# Patient Record
Sex: FEMALE | Race: WHITE | Employment: PART TIME | URBAN - METROPOLITAN AREA
[De-identification: names, ages, dates, MRNs, and addresses within clinical notes are randomized per-mention and may not be internally consistent; named-entity substitution may affect disease eponyms.]

---

## 2018-08-13 ENCOUNTER — HOSPITAL ENCOUNTER (OUTPATIENT)
Dept: GENERAL RADIOLOGY | Age: 73
Discharge: HOME | End: 2018-08-13
Payer: MEDICARE

## 2018-08-13 DIAGNOSIS — R05.9 COUGH: ICD-10-CM

## 2018-08-13 PROCEDURE — 71046 X-RAY EXAM CHEST 2 VIEWS: CPT

## 2021-07-09 ENCOUNTER — TRANSCRIBE ORDER (OUTPATIENT)
Dept: GENERAL RADIOLOGY | Age: 76
End: 2021-07-09

## 2021-07-09 ENCOUNTER — HOSPITAL ENCOUNTER (OUTPATIENT)
Dept: GENERAL RADIOLOGY | Age: 76
Discharge: HOME | End: 2021-07-09
Attending: FAMILY MEDICINE
Payer: MEDICARE
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
## 2021-07-09 DIAGNOSIS — M54.9 DORSALGIA: Primary | ICD-10-CM

## 2021-07-09 DIAGNOSIS — M54.9 DORSALGIA: ICD-10-CM

## 2021-07-09 PROCEDURE — 72050 X-RAY EXAM NECK SPINE 4/5VWS: CPT

## 2021-07-09 PROCEDURE — 72072 X-RAY EXAM THORAC SPINE 3VWS: CPT

## 2022-05-17 ENCOUNTER — OFFICE VISIT (OUTPATIENT)
Dept: NEUROLOGY | Age: 77
End: 2022-05-17
Payer: MEDICARE

## 2022-05-17 VITALS
HEIGHT: 65 IN | DIASTOLIC BLOOD PRESSURE: 84 MMHG | OXYGEN SATURATION: 98 % | WEIGHT: 146 LBS | HEART RATE: 80 BPM | SYSTOLIC BLOOD PRESSURE: 134 MMHG | BODY MASS INDEX: 24.32 KG/M2

## 2022-05-17 DIAGNOSIS — R26.89 IMBALANCE: Primary | ICD-10-CM

## 2022-05-17 DIAGNOSIS — E53.8 LOW SERUM VITAMIN B12: ICD-10-CM

## 2022-05-17 DIAGNOSIS — E55.9 VITAMIN D DEFICIENCY: ICD-10-CM

## 2022-05-17 DIAGNOSIS — G62.9 PERIPHERAL POLYNEUROPATHY: ICD-10-CM

## 2022-05-17 PROCEDURE — G9711 PT HX TOT COL OR COLON CA: HCPCS | Performed by: PSYCHIATRY & NEUROLOGY

## 2022-05-17 PROCEDURE — G8427 DOCREV CUR MEDS BY ELIG CLIN: HCPCS | Performed by: PSYCHIATRY & NEUROLOGY

## 2022-05-17 PROCEDURE — G8400 PT W/DXA NO RESULTS DOC: HCPCS | Performed by: PSYCHIATRY & NEUROLOGY

## 2022-05-17 PROCEDURE — G8420 CALC BMI NORM PARAMETERS: HCPCS | Performed by: PSYCHIATRY & NEUROLOGY

## 2022-05-17 PROCEDURE — G8432 DEP SCR NOT DOC, RNG: HCPCS | Performed by: PSYCHIATRY & NEUROLOGY

## 2022-05-17 PROCEDURE — 99204 OFFICE O/P NEW MOD 45 MIN: CPT | Performed by: PSYCHIATRY & NEUROLOGY

## 2022-05-17 PROCEDURE — G8536 NO DOC ELDER MAL SCRN: HCPCS | Performed by: PSYCHIATRY & NEUROLOGY

## 2022-05-17 PROCEDURE — 1090F PRES/ABSN URINE INCON ASSESS: CPT | Performed by: PSYCHIATRY & NEUROLOGY

## 2022-05-17 PROCEDURE — 1101F PT FALLS ASSESS-DOCD LE1/YR: CPT | Performed by: PSYCHIATRY & NEUROLOGY

## 2022-05-17 RX ORDER — ESOMEPRAZOLE MAGNESIUM 40 MG/1
40 CAPSULE, DELAYED RELEASE ORAL DAILY
COMMUNITY
Start: 2022-04-01

## 2022-05-17 RX ORDER — TRIAMTERENE AND HYDROCHLOROTHIAZIDE 37.5; 25 MG/1; MG/1
CAPSULE ORAL
COMMUNITY

## 2022-05-17 RX ORDER — ESTRADIOL 0.5 MG/1
TABLET ORAL
COMMUNITY

## 2022-05-17 RX ORDER — PANTOPRAZOLE SODIUM 40 MG/1
TABLET, DELAYED RELEASE ORAL
COMMUNITY
Start: 2022-03-03

## 2022-05-17 RX ORDER — BACLOFEN 10 MG/1
TABLET ORAL
COMMUNITY

## 2022-05-17 RX ORDER — DICLOFENAC SODIUM 10 MG/G
GEL TOPICAL
COMMUNITY

## 2022-05-17 RX ORDER — LANOLIN ALCOHOL/MO/W.PET/CERES
CREAM (GRAM) TOPICAL
COMMUNITY

## 2022-05-17 RX ORDER — FLUTICASONE PROPIONATE AND SALMETEROL 500; 50 UG/1; UG/1
POWDER RESPIRATORY (INHALATION)
COMMUNITY

## 2022-05-17 RX ORDER — DIAZEPAM 5 MG/1
TABLET ORAL
COMMUNITY

## 2022-05-17 RX ORDER — ERGOCALCIFEROL 1.25 MG/1
CAPSULE ORAL
COMMUNITY
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
## 2022-05-20 LAB
25(OH)D3+25(OH)D2 SERPL-MCNC: 17.2 NG/ML (ref 30–100)
ALBUMIN SERPL ELPH-MCNC: 4 G/DL (ref 2.9–4.4)
ALBUMIN/GLOB SERPL: 1.4 {RATIO} (ref 0.7–1.7)
ALPHA1 GLOB SERPL ELPH-MCNC: 0.3 G/DL (ref 0–0.4)
ALPHA2 GLOB SERPL ELPH-MCNC: 0.8 G/DL (ref 0.4–1)
B-GLOBULIN SERPL ELPH-MCNC: 1 G/DL (ref 0.7–1.3)
COPPER SERPL-MCNC: 130 UG/DL (ref 80–158)
GAMMA GLOB SERPL ELPH-MCNC: 0.9 G/DL (ref 0.4–1.8)
GLOBULIN SER-MCNC: 2.9 G/DL (ref 2.2–3.9)
IGA SERPL-MCNC: 132 MG/DL (ref 64–422)
IGG SERPL-MCNC: 883 MG/DL (ref 586–1602)
IGM SERPL-MCNC: 80 MG/DL (ref 26–217)
INTERPRETATION SERPL IEP-IMP: ABNORMAL
KAPPA LC FREE SER-MCNC: 28.9 MG/L (ref 3.3–19.4)
KAPPA LC FREE/LAMBDA FREE SER: 1.53 {RATIO} (ref 0.26–1.65)
LAMBDA LC FREE SERPL-MCNC: 18.9 MG/L (ref 5.7–26.3)
M PROTEIN SERPL ELPH-MCNC: ABNORMAL G/DL
PLEASE NOTE:, 149534: ABNORMAL
PROT SERPL-MCNC: 6.9 G/DL (ref 6–8.5)
VIT B12 SERPL-MCNC: 388 PG/ML (ref 232–1245)
ZINC SERPL-MCNC: 73 UG/DL (ref 44–115)

## 2022-05-21 NOTE — PROGRESS NOTES
Vitamin D is low and I would recommend she discuss with her primary physician a course of high-dose replacement if appropriate. Vitamin B12 is trending lower. I would recommend she take a daily B complex if not already. Copper and zinc are okay.

## 2022-05-27 ENCOUNTER — TELEPHONE (OUTPATIENT)
Dept: NEUROLOGY | Age: 77
End: 2022-05-27

## 2022-06-01 ENCOUNTER — TELEPHONE (OUTPATIENT)
Dept: NEUROLOGY | Age: 77
End: 2022-06-01

## 2022-06-15 ENCOUNTER — OFFICE VISIT (OUTPATIENT)
Dept: NEUROLOGY | Age: 77
End: 2022-06-15
Payer: MEDICARE

## 2022-06-15 DIAGNOSIS — R20.9 SKIN SENSATION DISTURBANCE: Primary | ICD-10-CM

## 2022-06-15 DIAGNOSIS — R26.89 IMBALANCE: ICD-10-CM

## 2022-06-15 PROCEDURE — 95886 MUSC TEST DONE W/N TEST COMP: CPT | Performed by: PSYCHIATRY & NEUROLOGY

## 2022-06-15 PROCEDURE — 95910 NRV CNDJ TEST 7-8 STUDIES: CPT | Performed by: PSYCHIATRY & NEUROLOGY

## 2022-06-15 NOTE — PROGRESS NOTES
[address] [facility]  [address]  Phone [phone] Fax [phone]  Test Date:  6/15/2022    Patient: Jonny Hawthorne : 1945 Physician: Rosana Blackburn DO   Sex: Female Height: 5' 5\" Ref Phys: Emir Greenberg   ID#: [id number]  Weight: 146 lbs. Technician: Mitch Oswald     Patient Complaints:  Imbalance; Skin sensation disturbance    NCV & EMG Findings:  Evaluation of the left Sup Peroneal sensory nerve showed no response (14 cm). The right Sup Peroneal sensory nerve showed reduced amplitude (2.0 µV). All remaining nerves  were within normal limits. All left vs. right side differences were within normal limits. All F Wave latencies were within normal limits. All examined muscles (as indicated in the following table) showed no evidence of electrical instability. Impression:  Extensive electrodiagnostic examination of the right lower extremity and additional nerve conduction studies of the left lower extremity reveals the followin. No evidence of a right lumbosacral motor radiculopathy. 2. No evidence of a generalized large-fiber sensorimotor polyneuropathy. 3. Absent L peroneal sensory response which is likely technical in nature. ___________________________  Maribel Blackburn, DO        Nerve Conduction Studies  Anti Sensory Summary Table     Stim Site NR Peak (ms) Norm Peak (ms) P-T Amp (µV) Norm P-T Amp Onset (ms) Site1 Site2 Delta-P (ms) Dist (cm) Shamir (m/s) Norm Shamir (m/s)   Left Sup Peroneal Anti Sensory (Ant Lat Mall)  31.6°C   14 cm NR  <4.4  >5.0  14 cm Ant Lat Mall  14.0  >32   Right Sup Peroneal Anti Sensory (Ant Lat Mall)  32.4°C   14 cm    2.5 <4.4 2.0 >5.0 1.8 14 cm Ant Lat Mall 2.5 14.0 56 >32   Left Sural Anti Sensory (Lat Mall)  31.7°C   Calf    3.4 <4.0 7.5 >5.0 2.9 Calf Lat Mall 3.4 14.0 41 >35   Right Sural Anti Sensory (Lat Mall)  32.3°C Calf    3.4 <4.0 5.6 >5.0 2.6 Calf Lat Mall 3.4 14.0 41 >35     Motor Summary Table     Stim Site NR Onset (ms) Norm Onset (ms) O-P Amp (mV) Norm O-P Amp Site1 Site2 Delta-0 (ms) Dist (cm) Shamir (m/s) Norm Shamir (m/s)   Left Peroneal Motor (Ext Dig Brev)  32.2°C   Ankle    3.7 <6.1 3.2 >2.5 B Fib Ankle 8.6 38.0 44 >38   B Fib    12.3  3.2  Poplt B Fib 1.5 10.0 67 >40   Poplt    13.8  3.0          Right Peroneal Motor (Ext Dig Brev)  32.9°C   Ankle    4.5 <6.1 3.1 >2.5 B Fib Ankle 8.5 38.0 45 >38   B Fib    13.0  2.8  Poplt B Fib 1.8 10.0 56 >40   Poplt    14.8  2.7          Left Tibial Motor (Abd Ramos Brev)  32.1°C   Ankle    4.4 <6.1 6.3 >3.0 Knee Ankle 9.9 43.0 43 >35   Knee    14.3  4.3          Right Tibial Motor (Abd Ramos Brev)  33.3°C   Ankle    4.1 <6.1 7.2 >3.0 Knee Ankle 10.6 43.0 41 >35   Knee    14.7  4.6            F Wave Studies     NR F-Lat (ms) Lat Norm (ms) L-R F-Lat (ms) L-R Lat Norm   Right Tibial (Mrkrs) (Abd Hallucis)  33.5°C      56.14 <61  <5.7     EMG     Side Muscle Nerve Root Ins Act Fibs Psw Amp Dur Poly Recrt Int Dene Coats Comment   Right Ext Dig Brev Dp Br Peronel L5, S1 Nml Nml Nml Nml Nml 0 Nml Nml    Right AbdHallucis MedPlantar S1-2 Nml Nml Nml Nml Nml 0 Nml Nml    Right PostTibialis Tibial L5, S1 Nml Nml Nml Nml Nml 0 Nml Nml    Right Gastroc Tibial S1-2 Nml Nml Nml Nml Nml 0 Nml Nml    Right AntTibialis Dp Br Peronel L4-5 Nml Nml Nml Nml Nml 0 Nml Nml    Right RectFemoris Femoral L2-4 Nml Nml Nml Nml Nml 0 Nml Nml          Waveforms: